# Patient Record
Sex: FEMALE | Race: WHITE | URBAN - METROPOLITAN AREA
[De-identification: names, ages, dates, MRNs, and addresses within clinical notes are randomized per-mention and may not be internally consistent; named-entity substitution may affect disease eponyms.]

---

## 2019-12-31 ENCOUNTER — OFFICE VISIT (OUTPATIENT)
Dept: URBAN - METROPOLITAN AREA CLINIC 11 | Facility: CLINIC | Age: 64
End: 2019-12-31
Payer: COMMERCIAL

## 2019-12-31 DIAGNOSIS — S05.01XA CORNEAL ABRASION W/O FB OF RIGHT EYE, INITIAL ENCOUNTER: Primary | ICD-10-CM

## 2019-12-31 PROCEDURE — 92133 CPTRZD OPH DX IMG PST SGM ON: CPT | Performed by: OPTOMETRIST

## 2019-12-31 PROCEDURE — 92004 COMPRE OPH EXAM NEW PT 1/>: CPT | Performed by: OPTOMETRIST

## 2019-12-31 ASSESSMENT — INTRAOCULAR PRESSURE
OS: 20
OD: 18

## 2019-12-31 NOTE — IMPRESSION/PLAN
Impression: Corneal abrasion w/o FB of right eye, initial encounter: S05. 01XA. OD.
injured on 12/25/19, went to ER, started on moxifloxacin gtt QID OD, Ketorolac QID OD. Plan: Pt ed re: condition. Continue Moxifloxacin QID OD.  d/c ketorolac. Begin AT's 4-5x/day. RTC x follow up in 1 week, or sooner if condition worsens.

## 2019-12-31 NOTE — IMPRESSION/PLAN
Impression: Anatomical narrow angle, bilateral: H40.033. OU.
OCT performed and reveiwed w/pt. Plan: Pt ed re: condition. IOP at 18/18 today, pt using combigan BID. Discussed condition, recommend consult w/glaucoma specialist for possible LPI. Refer for consult w/Dr. Kaycee Fenton, next available. Continue combigan BID until then.

## 2020-01-07 ENCOUNTER — OFFICE VISIT (OUTPATIENT)
Dept: URBAN - METROPOLITAN AREA CLINIC 11 | Facility: CLINIC | Age: 65
End: 2020-01-07
Payer: COMMERCIAL

## 2020-01-07 DIAGNOSIS — H40.053 OCULAR HYPERTENSION, BILATERAL: ICD-10-CM

## 2020-01-07 DIAGNOSIS — H40.033 ANATOMICAL NARROW ANGLE, BILATERAL: Primary | ICD-10-CM

## 2020-01-07 PROCEDURE — 76514 ECHO EXAM OF EYE THICKNESS: CPT | Performed by: OPHTHALMOLOGY

## 2020-01-07 PROCEDURE — 92002 INTRM OPH EXAM NEW PATIENT: CPT | Performed by: OPHTHALMOLOGY

## 2020-01-07 PROCEDURE — 92020 GONIOSCOPY: CPT | Performed by: OPHTHALMOLOGY

## 2020-01-07 ASSESSMENT — INTRAOCULAR PRESSURE
OD: 16
OS: 16

## 2020-01-07 NOTE — IMPRESSION/PLAN
Impression: Anatomical narrow angle, bilateral: H40.033 OU. Plan: ok for LPI OU in [address], RL2. Discussed narrow angles, risk of angle closure, symptoms of AACG and Laser peripheral iridotomy (LPI) risk/benefits/alternatives. Discussed that pt should avoid dilation and anti-depression, anti-anxiety, anti-histamine, or other medications contraindicated in angle closure glaucoma until the laser has been performed. Discussed risk of irreversible vision loss with glaucoma. Pt voiced understanding.

## 2020-01-07 NOTE — IMPRESSION/PLAN
Impression: Ocular hypertension, bilateral: H40.053. By Hx 43/38, /556 Plan: Discussed diagnosis with patient. Discussed treatment options. D/C IOP gtts (Combigan) Will continue to monitor. RNFL OCT and [address] after LPI OU.

## 2020-01-09 ENCOUNTER — OFFICE VISIT (OUTPATIENT)
Dept: URBAN - METROPOLITAN AREA CLINIC 11 | Facility: CLINIC | Age: 65
End: 2020-01-09
Payer: COMMERCIAL

## 2020-01-09 PROCEDURE — 92012 INTRM OPH EXAM EST PATIENT: CPT | Performed by: OPTOMETRIST

## 2020-01-09 ASSESSMENT — INTRAOCULAR PRESSURE
OD: 24
OS: 26
OS: 24

## 2020-01-09 NOTE — IMPRESSION/PLAN
Impression: Ocular hypertension, bilateral: H40.053. By Hx 43/38, /556 Plan: IOP slightly elevated today ou. pt wants to restart [place] because she is very afraid that she will loose vision for high iop.  pt advised that Combigan could effect her asthma. Pt refused to use any other drop to lower IOP. Pt instructed to stop the drop if asthma worsens. to Will continue to monitor.  RNFL OCT and [address] after LPI OU. keep appt for LPI

## 2020-01-29 ENCOUNTER — SURGERY (OUTPATIENT)
Dept: URBAN - METROPOLITAN AREA SURGERY 20 | Facility: SURGERY | Age: 65
End: 2020-01-29
Payer: COMMERCIAL

## 2020-02-05 ENCOUNTER — POST-OPERATIVE VISIT (OUTPATIENT)
Dept: URBAN - METROPOLITAN AREA CLINIC 11 | Facility: CLINIC | Age: 65
End: 2020-02-05

## 2020-02-05 DIAGNOSIS — Z09 ENCNTR FOR F/U EXAM AFT TRTMT FOR COND OTH THAN MALIG NEOPLM: Primary | ICD-10-CM

## 2020-02-05 PROCEDURE — 99024 POSTOP FOLLOW-UP VISIT: CPT | Performed by: OPTOMETRIST

## 2020-02-05 ASSESSMENT — INTRAOCULAR PRESSURE
OD: 19
OS: 23

## 2020-03-11 ENCOUNTER — OFFICE VISIT (OUTPATIENT)
Dept: URBAN - METROPOLITAN AREA CLINIC 11 | Facility: CLINIC | Age: 65
End: 2020-03-11
Payer: COMMERCIAL

## 2020-03-11 DIAGNOSIS — H16.223 KERATOCONJUNCTIVITIS SICCA, NOT SPECIFIED AS SJÖGREN'S, BILATERAL: Primary | ICD-10-CM

## 2020-03-11 PROCEDURE — 99213 OFFICE O/P EST LOW 20 MIN: CPT | Performed by: OPHTHALMOLOGY

## 2020-03-11 ASSESSMENT — INTRAOCULAR PRESSURE
OS: 24
OD: 20

## 2020-08-25 ENCOUNTER — OFFICE VISIT (OUTPATIENT)
Dept: URBAN - METROPOLITAN AREA CLINIC 11 | Facility: CLINIC | Age: 65
End: 2020-08-25
Payer: COMMERCIAL

## 2020-08-25 PROCEDURE — 99213 OFFICE O/P EST LOW 20 MIN: CPT | Performed by: OPTOMETRIST

## 2020-08-25 ASSESSMENT — INTRAOCULAR PRESSURE
OS: 21
OD: 22

## 2020-08-25 NOTE — IMPRESSION/PLAN
Impression: Ocular hypertension, bilateral: H40.053. By Hx 43/38, /556 Plan: IOP acceptable today. Continue combigan PRN. Monitor.  f/u 4 months IOP, DE & OCT RNFL

## 2021-04-05 ENCOUNTER — OFFICE VISIT (OUTPATIENT)
Dept: URBAN - METROPOLITAN AREA CLINIC 11 | Facility: CLINIC | Age: 66
End: 2021-04-05
Payer: COMMERCIAL

## 2021-04-05 DIAGNOSIS — H10.45 OTHER CHRONIC ALLERGIC CONJUNCTIVITIS: Primary | ICD-10-CM

## 2021-04-05 PROCEDURE — 92012 INTRM OPH EXAM EST PATIENT: CPT | Performed by: OPTOMETRIST

## 2021-04-05 NOTE — IMPRESSION/PLAN
Impression: Other chronic allergic conjunctivitis: H10.45. Right. Plan: no foreign body ( CL ) . Sample Lotemax 1gt tid OD x 3days.  f/u 2wks IOP DE OCT

## 2021-04-22 ENCOUNTER — OFFICE VISIT (OUTPATIENT)
Dept: URBAN - METROPOLITAN AREA CLINIC 11 | Facility: CLINIC | Age: 66
End: 2021-04-22
Payer: COMMERCIAL

## 2021-04-22 PROCEDURE — 92133 CPTRZD OPH DX IMG PST SGM ON: CPT | Performed by: OPTOMETRIST

## 2021-04-22 PROCEDURE — 92014 COMPRE OPH EXAM EST PT 1/>: CPT | Performed by: OPTOMETRIST

## 2021-04-22 ASSESSMENT — INTRAOCULAR PRESSURE
OD: 17
OS: 16

## 2021-04-22 NOTE — IMPRESSION/PLAN
Impression: Ocular hypertension, bilateral: H40.053. By Hx 43/38, /556 OCT 04/21 7/8 84/84 Plan: OCT RNFL ordered and preformed today. RNFL thinning OU but stable. IOP at good level today. Continue Brimonidine and timolol PRN.  Schedule VF 24-2. f/u 4 months IOP & VF

## 2021-04-22 NOTE — IMPRESSION/PLAN
Impression: Other chronic allergic conjunctivitis: H10.45. Right. Condition: resolved. Plan: D/C Lotemax. Monitor.

## 2021-08-23 ENCOUNTER — OFFICE VISIT (OUTPATIENT)
Dept: URBAN - METROPOLITAN AREA CLINIC 11 | Facility: CLINIC | Age: 66
End: 2021-08-23
Payer: COMMERCIAL

## 2021-08-23 DIAGNOSIS — H02.834 DERMATOCHALASIS OF LEFT UPPER EYELID: ICD-10-CM

## 2021-08-23 DIAGNOSIS — H02.831 DERMATOCHALASIS OF RIGHT UPPER EYELID: ICD-10-CM

## 2021-08-23 PROCEDURE — 92083 EXTENDED VISUAL FIELD XM: CPT | Performed by: OPTOMETRIST

## 2021-08-23 PROCEDURE — 99213 OFFICE O/P EST LOW 20 MIN: CPT | Performed by: OPTOMETRIST

## 2021-08-23 ASSESSMENT — INTRAOCULAR PRESSURE
OS: 18
OD: 17

## 2021-08-23 NOTE — IMPRESSION/PLAN
Impression: Ocular hypertension, bilateral: H40.053. By Hx 43/38, /556 OCT 04/21 7/8 84/84; HVF 08/21 OU: SA; Photos Plan: VF ordered and preformed today. VF defects - possible lid interaction (dermato). IOP at good level today. Continue Brimonidine and timolol PRN. Repeat VF 24-2 8 months.  f/u 4 months IOP & Photos

## 2022-01-17 ENCOUNTER — OFFICE VISIT (OUTPATIENT)
Dept: URBAN - METROPOLITAN AREA CLINIC 11 | Facility: CLINIC | Age: 67
End: 2022-01-17
Payer: COMMERCIAL

## 2022-01-17 DIAGNOSIS — H10.89 OTHER CONJUNCTIVITIS: Primary | ICD-10-CM

## 2022-01-17 PROCEDURE — 99213 OFFICE O/P EST LOW 20 MIN: CPT | Performed by: OPTOMETRIST

## 2022-01-17 NOTE — IMPRESSION/PLAN
Impression: Other conjunctivitis: H10.89. Left. Plan: Recommend Lotemax 3-4x/day OS. F/u in 2 weeks.

## 2022-11-04 ENCOUNTER — OFFICE VISIT (OUTPATIENT)
Facility: LOCATION | Age: 67
End: 2022-11-04
Payer: COMMERCIAL

## 2022-11-04 DIAGNOSIS — H53.40 VISUAL FIELD DEFECT: Primary | ICD-10-CM

## 2022-11-04 DIAGNOSIS — H02.413 MECHANICAL PTOSIS OF BILATERAL EYELIDS: ICD-10-CM

## 2022-11-04 DIAGNOSIS — H25.13 AGE-RELATED NUCLEAR CATARACT, BILATERAL: ICD-10-CM

## 2022-11-04 DIAGNOSIS — H02.423 MYOGENIC PTOSIS OF BILATERAL EYELIDS: ICD-10-CM

## 2022-11-04 DIAGNOSIS — H04.123 DRY EYE SYNDROME OF BILATERAL LACRIMAL GLANDS: ICD-10-CM

## 2022-11-04 DIAGNOSIS — H57.813 BROW PTOSIS, BILATERAL: ICD-10-CM

## 2022-11-04 PROCEDURE — 92285 EXTERNAL OCULAR PHOTOGRAPHY: CPT | Performed by: OPHTHALMOLOGY

## 2022-11-04 PROCEDURE — 99204 OFFICE O/P NEW MOD 45 MIN: CPT | Performed by: OPHTHALMOLOGY

## 2022-11-04 NOTE — IMPRESSION/PLAN
Impression: Dry eye syndrome of bilateral lacrimal glands: H04.123. Plan: Recommend use of high quality artificial tears 3-4x per day.

## 2022-11-04 NOTE — IMPRESSION/PLAN
Impression: Visual field defect: H53.40. Plan: Patient subjectively describes peripheral field defect. VF ordered. N/A Ptosis VF.

## 2022-11-04 NOTE — IMPRESSION/PLAN
Impression: Mechanical ptosis of bilateral eyelids: H02.413. Plan: Patient examined. Chart review. Patient has signs, symptoms and findings consistent with BOTH myogenic and mechanical ptosis. This was diagrammatically explaind to the patient. Patient voiced understanding. Discussed known options for management (do nothing, conservative management, and surgical intervention.)  Patient elects surgical intervention at this time. Benefits and risks discussed with emphasis on functional component (myogenic ptosis correction) and mechanical component (resection of skin.)  Return for planned surgical intervention after Counseling and obtaining Informed Consent. 

90125 RT, LT BNILATERAL UPPER LID EXTERNAL APPROACH TARSOLEVATOR ADVANCEMENT

## 2022-11-04 NOTE — IMPRESSION/PLAN
Impression: Brow ptosis, bilateral: H57.813. Plan: MECHANICAL PTOSIS OF EACH UPPER EYELID IS CAUSED BOTH BY THE BROW PTOSIS AS WELL AS UPPER EYELID DERMATOCHALASIS SKIN. WHEN THE BROWS ARE MANUALLY LIFTED UP, THERE IS STILL EXCESS UPER LID SKIN REATING ON THE UPPER LID LASHES. COMBINED BILATERAL DIRECT BROW LIFT AND BILATERAL UPPER LID EXTENSIVE BLEHAROPLASTY. 

29978-AQ, LT DIRECT BROW LIFT
83019-SI, LT EXTENSIVE UPPER LID BLEPHAROPLASTY

## 2022-11-23 ENCOUNTER — OFFICE VISIT (OUTPATIENT)
Dept: URBAN - METROPOLITAN AREA CLINIC 45 | Facility: CLINIC | Age: 67
End: 2022-11-23
Payer: COMMERCIAL

## 2022-11-23 DIAGNOSIS — H40.033 ANATOMICAL NARROW ANGLE, BILATERAL: ICD-10-CM

## 2022-11-23 DIAGNOSIS — H25.13 AGE-RELATED NUCLEAR CATARACT, BILATERAL: ICD-10-CM

## 2022-11-23 DIAGNOSIS — H40.053 OCULAR HYPERTENSION, BILATERAL: Primary | ICD-10-CM

## 2022-11-23 PROCEDURE — 99213 OFFICE O/P EST LOW 20 MIN: CPT | Performed by: OPTOMETRIST

## 2022-11-23 PROCEDURE — 92133 CPTRZD OPH DX IMG PST SGM ON: CPT | Performed by: OPTOMETRIST

## 2022-11-23 RX ORDER — BRIMONIDINE TARTRATE 2 MG/ML
0.2 % SOLUTION/ DROPS OPHTHALMIC
Qty: 5 | Refills: 3 | Status: ACTIVE
Start: 2022-11-23

## 2022-11-23 RX ORDER — TIMOLOL MALEATE 5 MG/ML
0.5 % SOLUTION/ DROPS OPHTHALMIC
Qty: 5 | Refills: 3 | Status: ACTIVE
Start: 2022-11-23

## 2022-11-23 ASSESSMENT — INTRAOCULAR PRESSURE
OD: 16
OS: 18

## 2022-11-23 NOTE — IMPRESSION/PLAN
Impression: Ocular hypertension, bilateral: H40.053.  Plan: continue brimonidine bid ou, timolol bid ou

## 2023-01-30 ENCOUNTER — OFFICE VISIT (OUTPATIENT)
Facility: LOCATION | Age: 68
End: 2023-01-30
Payer: COMMERCIAL

## 2023-01-30 DIAGNOSIS — H10.413 CHRONIC GIANT PAPILLARY CONJUNCTIVITIS, BILATERAL: Primary | ICD-10-CM

## 2023-01-30 PROCEDURE — 99203 OFFICE O/P NEW LOW 30 MIN: CPT

## 2023-01-30 RX ORDER — NEOMYCIN SULFATE, POLYMYXIN B SULFATE AND DEXAMETHASONE 3.5; 10000; 1 MG/ML; [USP'U]/ML; MG/ML
SUSPENSION OPHTHALMIC
Qty: 5 | Refills: 2 | Status: ACTIVE
Start: 2023-01-30

## 2023-01-30 ASSESSMENT — INTRAOCULAR PRESSURE
OD: 19
OS: 17

## 2023-01-30 ASSESSMENT — KERATOMETRY
OD: 46.38
OS: 46.63

## 2023-01-30 NOTE — IMPRESSION/PLAN
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 Plan: Pt educated on condition. Pt to d/c CL wear for a couple of weeks and reminded pt about proper CL hygiene. Pt started on steroid drop to help reduce inflammation. Also recommended pataday QAM and PF AT [facility]. Educated that condition can take weeks to resolve. Pt expressed understanding. Pt to notify clinic if symptoms worsen or do not improve. Rx: maxitrol QID x2 weeks RTC: 2 weeks

## 2023-03-20 ENCOUNTER — TESTING ONLY (OUTPATIENT)
Facility: LOCATION | Age: 68
End: 2023-03-20
Payer: COMMERCIAL

## 2023-03-20 DIAGNOSIS — H57.813 BROW PTOSIS, BILATERAL: ICD-10-CM

## 2023-03-20 DIAGNOSIS — H53.453 BILATERAL PERIPHERAL VISUAL FIELD DEFECT: Primary | ICD-10-CM

## 2023-03-20 PROCEDURE — 92081 LIMITED VISUAL FIELD XM: CPT | Performed by: OPHTHALMOLOGY

## 2023-03-22 NOTE — IMPRESSION/PLAN
Impression: Bilateral peripheral visual field defect: H53.453. Plan: Patient subjectively describes peripheral field defect. VF ordered & reviewed. Formal visual field testing reflects the clinical concern at this time. Ptosis effect OD at 20  degrees untapped & 50 degrees taped (30 degrees improvement). Ptosis effect OS at 20 degrees untapped & 40 degrees taped (20 degrees improvement.) This is visually significant.